# Patient Record
Sex: FEMALE
[De-identification: names, ages, dates, MRNs, and addresses within clinical notes are randomized per-mention and may not be internally consistent; named-entity substitution may affect disease eponyms.]

---

## 2020-09-11 PROBLEM — Z00.00 ENCOUNTER FOR PREVENTIVE HEALTH EXAMINATION: Status: ACTIVE | Noted: 2020-09-11

## 2020-09-21 ENCOUNTER — APPOINTMENT (OUTPATIENT)
Dept: NEUROLOGY | Facility: CLINIC | Age: 40
End: 2020-09-21
Payer: MEDICAID

## 2020-09-21 VITALS
HEIGHT: 63 IN | OXYGEN SATURATION: 97 % | WEIGHT: 176 LBS | BODY MASS INDEX: 31.18 KG/M2 | DIASTOLIC BLOOD PRESSURE: 83 MMHG | SYSTOLIC BLOOD PRESSURE: 122 MMHG | HEART RATE: 78 BPM

## 2020-09-21 VITALS — OXYGEN SATURATION: 97 % | SYSTOLIC BLOOD PRESSURE: 113 MMHG | DIASTOLIC BLOOD PRESSURE: 86 MMHG | HEART RATE: 83 BPM

## 2020-09-21 DIAGNOSIS — R25.1 TREMOR, UNSPECIFIED: ICD-10-CM

## 2020-09-21 PROCEDURE — 99205 OFFICE O/P NEW HI 60 MIN: CPT

## 2020-09-21 RX ORDER — PRIMIDONE 50 MG/1
50 TABLET ORAL
Qty: 60 | Refills: 3 | Status: ACTIVE | COMMUNITY
Start: 2020-09-21 | End: 1900-01-01

## 2020-09-21 NOTE — PHYSICAL EXAM
[General Appearance - Alert] : alert [General Appearance - In No Acute Distress] : in no acute distress [Affect] : the affect was normal [Person] : oriented to person [Place] : oriented to place [Short Term Intact] : short term memory intact [Cranial Nerves Oculomotor (III)] : extraocular motion intact [Cranial Nerves Trigeminal (V)] : facial sensation intact symmetrically [Cranial Nerves Facial (VII)] : face symmetrical [Cranial Nerves Vestibulocochlear (VIII)] : hearing was intact bilaterally [Motor Tone] : muscle tone was normal in all four extremities [Motor Strength] : muscle strength was normal in all four extremities [No Muscle Atrophy] : normal bulk in all four extremities [Motor Handedness Right-Handed] : the patient is right hand dominant [Motor Strength Upper Extremities Bilaterally] : strength was normal in both upper extremities [Motor Strength Lower Extremities Bilaterally] : strength was normal in both lower extremities [Sensation Tactile Decrease] : light touch was intact [FreeTextEntry1] : patient is noted to have postural and intention tremor right worse than left. There is no bradykinesia but interference of movement due to tremor. No difficulty getting up from the chair. No difficulty with tandem walk. Spirals are scanned in EHR. handwriting is large in size. \par Patient's handwriting is much more shakier on the intake form [Extraocular Movements] : extraocular movements were intact [Hearing Threshold Finger Rub Not [place]] : hearing was normal [Neck Cervical Mass (___cm)] : no neck mass was observed [Edema] : there was no peripheral edema [Abdomen Soft] : soft [No CVA Tenderness] : no ~M costovertebral angle tenderness [Abnormal Walk] : normal gait [] : no rash

## 2020-09-21 NOTE — DISCUSSION/SUMMARY
[FreeTextEntry1] : This is a 40-year-old right-handed female who presents with chief complaints of tremors for about 4-5 months. On exam, her tremors are mostly action and intention in nature.  She denies any family history of tremor. She is on several medications that can have tremor as a side effect. Has history of asthma cannot use beta blockers\par \par Impression-tremor, essential tremor versus drug-induced /exacerbated by medications\par \par Plan\par Differential diagnosis as above discussed with the patient\par Patient states that she's had recent labs done. I've asked her to send me a copy I would be interested in checking Depakote level, thyroid, CMP\par Start primidone 50 mg at bedtime. If no side effects can increase to 2 tablets at bedtime after a week. Side effects discussed in detail\par Followup in 3 weeks

## 2020-09-21 NOTE — HISTORY OF PRESENT ILLNESS
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 Patient says she was continued on 60 mg daily.    [FreeTextEntry1] : This is a 40-year-old right-handed female who presents with chief complaints of tremors.\par \par Patient states that she has noticed tremors for the last 4-5 months. No family history of tremor. The tremors are  mostly with activity and interferewith her ability to eat , drink , write. Tremors are worsened with anxiety. Patient does not drink any coffee or alcohol (none since the last year). She has not had any new medications. She was tried on something a few months ago but it led to a rash and was discontinued. She does not remember the name of that medication.\par She is on steady dose of Depakote 750 mg twice a day. She states that she has had recent blood levels checked which were normal I do not have those reports.\par She is also on trazodone and Seroquel.\par \par Patient states that in the past she had issues with alcoholism and would get seizures because of it. She has been on Depakote for 5 years now. She also reports having depression and anxiety. She follows up with psychiatry. She reports a history of asthma.\par \par Review of systems-complete review of systems is performed and is negative except as listed in history of present illness\par \par Past medical history\par Alcoholism\par Seizures\par Anxiety\par Depression\par Asthma\par \par Family history unremarkable\par Social history-she is in a residential program. Has not had any alcohol since last year

## 2020-11-02 ENCOUNTER — APPOINTMENT (OUTPATIENT)
Dept: NEUROLOGY | Facility: CLINIC | Age: 40
End: 2020-11-02

## 2022-03-17 ENCOUNTER — OUTPATIENT (OUTPATIENT)
Dept: OUTPATIENT SERVICES | Facility: HOSPITAL | Age: 42
LOS: 1 days | Discharge: STILL ADMITTED OR ADMINISTRATIVE | End: 2022-03-17
Payer: MEDICAID

## 2022-03-17 PROCEDURE — 90839 PSYTX CRISIS INITIAL 60 MIN: CPT

## 2022-03-18 DIAGNOSIS — Z87.898 PERSONAL HISTORY OF OTHER SPECIFIED CONDITIONS: ICD-10-CM

## 2022-03-18 DIAGNOSIS — G40.909 EPILEPSY, UNSPECIFIED, NOT INTRACTABLE, WITHOUT STATUS EPILEPTICUS: ICD-10-CM

## 2022-03-18 DIAGNOSIS — F41.9 ANXIETY DISORDER, UNSPECIFIED: ICD-10-CM

## 2022-03-18 DIAGNOSIS — F31.9 BIPOLAR DISORDER, UNSPECIFIED: ICD-10-CM

## 2022-03-18 DIAGNOSIS — F31.4 BIPOLAR DISORDER, CURRENT EPISODE DEPRESSED, SEVERE, WITHOUT PSYCHOTIC FEATURES: ICD-10-CM

## 2022-03-18 DIAGNOSIS — F10.21 ALCOHOL DEPENDENCE, IN REMISSION: ICD-10-CM

## 2022-07-25 ENCOUNTER — HOSPITAL ENCOUNTER (INPATIENT)
Dept: HOSPITAL 74 - YASAS | Age: 42
LOS: 11 days | Discharge: HOME | DRG: 772 | End: 2022-08-05
Attending: PSYCHIATRY & NEUROLOGY | Admitting: ALLERGY & IMMUNOLOGY
Payer: COMMERCIAL

## 2022-07-25 VITALS — BODY MASS INDEX: 30.4 KG/M2

## 2022-07-25 DIAGNOSIS — R73.9: ICD-10-CM

## 2022-07-25 DIAGNOSIS — F12.20: ICD-10-CM

## 2022-07-25 DIAGNOSIS — Z86.69: ICD-10-CM

## 2022-07-25 DIAGNOSIS — F25.9: ICD-10-CM

## 2022-07-25 DIAGNOSIS — Z91.013: ICD-10-CM

## 2022-07-25 DIAGNOSIS — Z91.410: ICD-10-CM

## 2022-07-25 DIAGNOSIS — F14.20: ICD-10-CM

## 2022-07-25 DIAGNOSIS — F31.9: ICD-10-CM

## 2022-07-25 DIAGNOSIS — F10.230: Primary | ICD-10-CM

## 2022-07-25 DIAGNOSIS — F19.24: ICD-10-CM

## 2022-07-25 DIAGNOSIS — Z56.0: ICD-10-CM

## 2022-07-25 DIAGNOSIS — F15.10: ICD-10-CM

## 2022-07-25 DIAGNOSIS — Z59.01: ICD-10-CM

## 2022-07-25 DIAGNOSIS — F19.282: ICD-10-CM

## 2022-07-25 DIAGNOSIS — Z62.810: ICD-10-CM

## 2022-07-25 PROCEDURE — U0005 INFEC AGEN DETEC AMPLI PROBE: HCPCS

## 2022-07-25 PROCEDURE — U0003 INFECTIOUS AGENT DETECTION BY NUCLEIC ACID (DNA OR RNA); SEVERE ACUTE RESPIRATORY SYNDROME CORONAVIRUS 2 (SARS-COV-2) (CORONAVIRUS DISEASE [COVID-19]), AMPLIFIED PROBE TECHNIQUE, MAKING USE OF HIGH THROUGHPUT TECHNOLOGIES AS DESCRIBED BY CMS-2020-01-R: HCPCS

## 2022-07-25 SDOH — ECONOMIC STABILITY - HOUSING INSECURITY: SHELTERED HOMELESSNESS: Z59.01

## 2022-07-25 SDOH — ECONOMIC STABILITY - INCOME SECURITY: UNEMPLOYMENT, UNSPECIFIED: Z56.0

## 2022-07-26 VITALS — RESPIRATION RATE: 18 BRPM

## 2022-07-26 LAB
ALBUMIN SERPL-MCNC: 2.9 G/DL (ref 3.4–5)
ALP SERPL-CCNC: 79 U/L (ref 45–117)
ALT SERPL-CCNC: 16 U/L (ref 13–61)
ANION GAP SERPL CALC-SCNC: 11 MMOL/L (ref 8–16)
AST SERPL-CCNC: 11 U/L (ref 15–37)
BILIRUB SERPL-MCNC: 0.3 MG/DL (ref 0.2–1)
BUN SERPL-MCNC: 19.2 MG/DL (ref 7–18)
CALCIUM SERPL-MCNC: 8.4 MG/DL (ref 8.5–10.1)
CHLORIDE SERPL-SCNC: 110 MMOL/L (ref 98–107)
CO2 SERPL-SCNC: 22 MMOL/L (ref 21–32)
CREAT SERPL-MCNC: 0.7 MG/DL (ref 0.55–1.3)
DEPRECATED RDW RBC AUTO: 13.5 % (ref 11.6–15.6)
GLUCOSE SERPL-MCNC: 133 MG/DL (ref 74–106)
HCT VFR BLD CALC: 36.4 % (ref 32.4–45.2)
HGB BLD-MCNC: 12.2 GM/DL (ref 10.7–15.3)
MCH RBC QN AUTO: 30.6 PG (ref 25.7–33.7)
MCHC RBC AUTO-ENTMCNC: 33.4 G/DL (ref 32–36)
MCV RBC: 91.4 FL (ref 80–96)
PLATELET # BLD AUTO: 319 10^3/UL (ref 134–434)
PMV BLD: 9.2 FL (ref 7.5–11.1)
PROT SERPL-MCNC: 5.8 G/DL (ref 6.4–8.2)
RBC # BLD AUTO: 3.98 M/MM3 (ref 3.6–5.2)
SODIUM SERPL-SCNC: 143 MMOL/L (ref 136–145)
WBC # BLD AUTO: 9.2 K/MM3 (ref 4–10)

## 2022-07-26 PROCEDURE — HZ42ZZZ GROUP COUNSELING FOR SUBSTANCE ABUSE TREATMENT, COGNITIVE-BEHAVIORAL: ICD-10-PCS | Performed by: PSYCHIATRY & NEUROLOGY

## 2022-07-26 RX ADMIN — DIVALPROEX SODIUM SCH MG: 250 TABLET, FILM COATED, EXTENDED RELEASE ORAL at 21:33

## 2022-07-26 RX ADMIN — BUDESONIDE AND FORMOTEROL FUMARATE DIHYDRATE SCH: 80; 4.5 AEROSOL RESPIRATORY (INHALATION) at 21:33

## 2022-07-26 RX ADMIN — Medication SCH MG: at 21:33

## 2022-07-26 RX ADMIN — IBUPROFEN PRN MG: 400 TABLET, FILM COATED ORAL at 13:35

## 2022-07-26 RX ADMIN — BACITRACIN ZINC SCH GM: 500 OINTMENT TOPICAL at 19:53

## 2022-07-26 RX ADMIN — BUDESONIDE AND FORMOTEROL FUMARATE DIHYDRATE SCH: 80; 4.5 AEROSOL RESPIRATORY (INHALATION) at 12:45

## 2022-07-26 RX ADMIN — Medication SCH: at 12:45

## 2022-07-27 LAB
APPEARANCE UR: CLEAR
BILIRUB UR STRIP.AUTO-MCNC: NEGATIVE MG/DL
COLOR UR: YELLOW
KETONES UR QL STRIP: NEGATIVE
LEUKOCYTE ESTERASE UR QL STRIP.AUTO: NEGATIVE
NITRITE UR QL STRIP: NEGATIVE
PH UR: 6 [PH] (ref 5–8)
PROT UR QL STRIP: NEGATIVE
PROT UR QL STRIP: NEGATIVE
SP GR UR: 1.01 (ref 1.01–1.03)
UROBILINOGEN UR STRIP-MCNC: 0.2 MG/DL (ref 0.2–1)

## 2022-07-27 RX ADMIN — Medication SCH MG: at 21:42

## 2022-07-27 RX ADMIN — BACITRACIN ZINC SCH: 500 OINTMENT TOPICAL at 11:29

## 2022-07-27 RX ADMIN — Medication SCH MG: at 21:41

## 2022-07-27 RX ADMIN — IBUPROFEN PRN MG: 400 TABLET, FILM COATED ORAL at 15:28

## 2022-07-27 RX ADMIN — BUDESONIDE AND FORMOTEROL FUMARATE DIHYDRATE SCH: 80; 4.5 AEROSOL RESPIRATORY (INHALATION) at 11:30

## 2022-07-27 RX ADMIN — DIVALPROEX SODIUM SCH: 250 TABLET, FILM COATED, EXTENDED RELEASE ORAL at 11:29

## 2022-07-27 RX ADMIN — DIVALPROEX SODIUM SCH MG: 250 TABLET, FILM COATED, EXTENDED RELEASE ORAL at 21:43

## 2022-07-27 RX ADMIN — BUDESONIDE AND FORMOTEROL FUMARATE DIHYDRATE SCH PUFF: 80; 4.5 AEROSOL RESPIRATORY (INHALATION) at 21:42

## 2022-07-27 RX ADMIN — Medication SCH: at 11:29

## 2022-07-27 RX ADMIN — OLANZAPINE SCH MG: 7.5 TABLET, FILM COATED ORAL at 21:44

## 2022-07-28 RX ADMIN — Medication SCH MG: at 21:46

## 2022-07-28 RX ADMIN — OLANZAPINE SCH MG: 7.5 TABLET, FILM COATED ORAL at 21:46

## 2022-07-28 RX ADMIN — DIVALPROEX SODIUM SCH MG: 250 TABLET, FILM COATED, EXTENDED RELEASE ORAL at 21:47

## 2022-07-28 RX ADMIN — BACITRACIN ZINC SCH GM: 500 OINTMENT TOPICAL at 12:17

## 2022-07-28 RX ADMIN — IBUPROFEN PRN MG: 400 TABLET, FILM COATED ORAL at 12:48

## 2022-07-28 RX ADMIN — DIVALPROEX SODIUM SCH MG: 250 TABLET, FILM COATED, EXTENDED RELEASE ORAL at 12:17

## 2022-07-28 RX ADMIN — Medication SCH TAB: at 12:18

## 2022-07-28 RX ADMIN — ALUMINUM HYDROXIDE, MAGNESIUM HYDROXIDE, AND SIMETHICONE PRN ML: 200; 200; 20 SUSPENSION ORAL at 16:34

## 2022-07-28 RX ADMIN — BUDESONIDE AND FORMOTEROL FUMARATE DIHYDRATE SCH PUFF: 80; 4.5 AEROSOL RESPIRATORY (INHALATION) at 21:45

## 2022-07-28 RX ADMIN — BUDESONIDE AND FORMOTEROL FUMARATE DIHYDRATE SCH PUFF: 80; 4.5 AEROSOL RESPIRATORY (INHALATION) at 12:17

## 2022-07-29 RX ADMIN — Medication SCH TAB: at 10:17

## 2022-07-29 RX ADMIN — DIVALPROEX SODIUM SCH MG: 250 TABLET, FILM COATED, EXTENDED RELEASE ORAL at 10:18

## 2022-07-29 RX ADMIN — OLANZAPINE SCH MG: 7.5 TABLET, FILM COATED ORAL at 21:28

## 2022-07-29 RX ADMIN — BUDESONIDE AND FORMOTEROL FUMARATE DIHYDRATE SCH: 80; 4.5 AEROSOL RESPIRATORY (INHALATION) at 21:50

## 2022-07-29 RX ADMIN — BACITRACIN ZINC SCH GM: 500 OINTMENT TOPICAL at 10:19

## 2022-07-29 RX ADMIN — BUDESONIDE AND FORMOTEROL FUMARATE DIHYDRATE SCH PUFF: 80; 4.5 AEROSOL RESPIRATORY (INHALATION) at 10:17

## 2022-07-29 RX ADMIN — DIVALPROEX SODIUM SCH MG: 250 TABLET, FILM COATED, EXTENDED RELEASE ORAL at 21:28

## 2022-07-29 RX ADMIN — IBUPROFEN PRN MG: 400 TABLET, FILM COATED ORAL at 11:17

## 2022-07-29 RX ADMIN — Medication SCH MG: at 21:28

## 2022-07-29 RX ADMIN — ALUMINUM HYDROXIDE, MAGNESIUM HYDROXIDE, AND SIMETHICONE PRN ML: 200; 200; 20 SUSPENSION ORAL at 18:41

## 2022-07-30 RX ADMIN — Medication SCH TAB: at 10:16

## 2022-07-30 RX ADMIN — Medication SCH MG: at 21:34

## 2022-07-30 RX ADMIN — BUDESONIDE AND FORMOTEROL FUMARATE DIHYDRATE SCH PUFF: 80; 4.5 AEROSOL RESPIRATORY (INHALATION) at 21:33

## 2022-07-30 RX ADMIN — OLANZAPINE SCH MG: 7.5 TABLET, FILM COATED ORAL at 21:33

## 2022-07-30 RX ADMIN — DIVALPROEX SODIUM SCH MG: 250 TABLET, FILM COATED, EXTENDED RELEASE ORAL at 21:33

## 2022-07-30 RX ADMIN — BACITRACIN ZINC SCH GM: 500 OINTMENT TOPICAL at 10:43

## 2022-07-30 RX ADMIN — Medication SCH MG: at 21:33

## 2022-07-30 RX ADMIN — BUDESONIDE AND FORMOTEROL FUMARATE DIHYDRATE SCH PUFF: 80; 4.5 AEROSOL RESPIRATORY (INHALATION) at 10:17

## 2022-07-30 RX ADMIN — DIVALPROEX SODIUM SCH MG: 250 TABLET, FILM COATED, EXTENDED RELEASE ORAL at 10:16

## 2022-07-31 RX ADMIN — IBUPROFEN PRN MG: 400 TABLET, FILM COATED ORAL at 19:56

## 2022-07-31 RX ADMIN — BUDESONIDE AND FORMOTEROL FUMARATE DIHYDRATE SCH: 80; 4.5 AEROSOL RESPIRATORY (INHALATION) at 10:46

## 2022-07-31 RX ADMIN — BUDESONIDE AND FORMOTEROL FUMARATE DIHYDRATE SCH PUFF: 80; 4.5 AEROSOL RESPIRATORY (INHALATION) at 21:32

## 2022-07-31 RX ADMIN — Medication SCH MG: at 21:31

## 2022-07-31 RX ADMIN — Medication SCH TAB: at 10:44

## 2022-07-31 RX ADMIN — BACITRACIN ZINC SCH: 500 OINTMENT TOPICAL at 10:45

## 2022-07-31 RX ADMIN — DIVALPROEX SODIUM SCH MG: 250 TABLET, FILM COATED, EXTENDED RELEASE ORAL at 10:45

## 2022-07-31 RX ADMIN — Medication SCH MG: at 21:32

## 2022-07-31 RX ADMIN — DIVALPROEX SODIUM SCH MG: 250 TABLET, FILM COATED, EXTENDED RELEASE ORAL at 21:31

## 2022-07-31 RX ADMIN — OLANZAPINE SCH MG: 7.5 TABLET, FILM COATED ORAL at 21:31

## 2022-08-01 RX ADMIN — BUDESONIDE AND FORMOTEROL FUMARATE DIHYDRATE SCH PUFF: 80; 4.5 AEROSOL RESPIRATORY (INHALATION) at 10:18

## 2022-08-01 RX ADMIN — BUDESONIDE AND FORMOTEROL FUMARATE DIHYDRATE SCH: 80; 4.5 AEROSOL RESPIRATORY (INHALATION) at 21:50

## 2022-08-01 RX ADMIN — Medication SCH MG: at 21:49

## 2022-08-01 RX ADMIN — BACITRACIN ZINC SCH: 500 OINTMENT TOPICAL at 10:19

## 2022-08-01 RX ADMIN — DIVALPROEX SODIUM SCH MG: 250 TABLET, FILM COATED, EXTENDED RELEASE ORAL at 21:48

## 2022-08-01 RX ADMIN — DIVALPROEX SODIUM SCH MG: 250 TABLET, FILM COATED, EXTENDED RELEASE ORAL at 10:18

## 2022-08-01 RX ADMIN — OLANZAPINE SCH MG: 7.5 TABLET, FILM COATED ORAL at 21:49

## 2022-08-01 RX ADMIN — Medication SCH TAB: at 10:18

## 2022-08-02 RX ADMIN — BUDESONIDE AND FORMOTEROL FUMARATE DIHYDRATE SCH PUFF: 80; 4.5 AEROSOL RESPIRATORY (INHALATION) at 10:23

## 2022-08-02 RX ADMIN — BUDESONIDE AND FORMOTEROL FUMARATE DIHYDRATE SCH PUFF: 80; 4.5 AEROSOL RESPIRATORY (INHALATION) at 21:36

## 2022-08-02 RX ADMIN — DIVALPROEX SODIUM SCH MG: 250 TABLET, FILM COATED, EXTENDED RELEASE ORAL at 10:24

## 2022-08-02 RX ADMIN — Medication SCH MG: at 21:34

## 2022-08-02 RX ADMIN — OLANZAPINE SCH MG: 7.5 TABLET, FILM COATED ORAL at 21:34

## 2022-08-02 RX ADMIN — BACITRACIN ZINC SCH: 500 OINTMENT TOPICAL at 10:25

## 2022-08-02 RX ADMIN — DIVALPROEX SODIUM SCH MG: 250 TABLET, FILM COATED, EXTENDED RELEASE ORAL at 21:34

## 2022-08-02 RX ADMIN — Medication SCH TAB: at 10:23

## 2022-08-03 RX ADMIN — BUDESONIDE AND FORMOTEROL FUMARATE DIHYDRATE SCH: 80; 4.5 AEROSOL RESPIRATORY (INHALATION) at 10:23

## 2022-08-03 RX ADMIN — IBUPROFEN PRN MG: 400 TABLET, FILM COATED ORAL at 16:02

## 2022-08-03 RX ADMIN — DIVALPROEX SODIUM SCH MG: 250 TABLET, FILM COATED, EXTENDED RELEASE ORAL at 21:48

## 2022-08-03 RX ADMIN — Medication SCH TAB: at 10:23

## 2022-08-03 RX ADMIN — OLANZAPINE SCH MG: 7.5 TABLET, FILM COATED ORAL at 21:48

## 2022-08-03 RX ADMIN — Medication SCH MG: at 21:48

## 2022-08-03 RX ADMIN — BACITRACIN ZINC SCH: 500 OINTMENT TOPICAL at 10:23

## 2022-08-03 RX ADMIN — BUDESONIDE AND FORMOTEROL FUMARATE DIHYDRATE SCH: 80; 4.5 AEROSOL RESPIRATORY (INHALATION) at 23:20

## 2022-08-03 RX ADMIN — DIVALPROEX SODIUM SCH MG: 250 TABLET, FILM COATED, EXTENDED RELEASE ORAL at 10:24

## 2022-08-04 VITALS — TEMPERATURE: 97.5 F

## 2022-08-04 RX ADMIN — Medication SCH TAB: at 10:52

## 2022-08-04 RX ADMIN — BUDESONIDE AND FORMOTEROL FUMARATE DIHYDRATE SCH PUFF: 80; 4.5 AEROSOL RESPIRATORY (INHALATION) at 10:52

## 2022-08-04 RX ADMIN — ALUMINUM HYDROXIDE, MAGNESIUM HYDROXIDE, AND SIMETHICONE PRN ML: 200; 200; 20 SUSPENSION ORAL at 19:45

## 2022-08-04 RX ADMIN — OLANZAPINE SCH MG: 7.5 TABLET, FILM COATED ORAL at 21:16

## 2022-08-04 RX ADMIN — BACITRACIN ZINC SCH: 500 OINTMENT TOPICAL at 10:52

## 2022-08-04 RX ADMIN — Medication SCH MG: at 21:16

## 2022-08-04 RX ADMIN — DIVALPROEX SODIUM SCH MG: 250 TABLET, FILM COATED, EXTENDED RELEASE ORAL at 10:53

## 2022-08-04 RX ADMIN — BUDESONIDE AND FORMOTEROL FUMARATE DIHYDRATE SCH: 80; 4.5 AEROSOL RESPIRATORY (INHALATION) at 21:17

## 2022-08-04 RX ADMIN — IBUPROFEN PRN MG: 400 TABLET, FILM COATED ORAL at 16:33

## 2022-08-04 RX ADMIN — DIVALPROEX SODIUM SCH MG: 250 TABLET, FILM COATED, EXTENDED RELEASE ORAL at 21:15

## 2022-08-05 VITALS — SYSTOLIC BLOOD PRESSURE: 125 MMHG | DIASTOLIC BLOOD PRESSURE: 77 MMHG | HEART RATE: 73 BPM

## 2022-08-05 RX ADMIN — BUDESONIDE AND FORMOTEROL FUMARATE DIHYDRATE SCH: 80; 4.5 AEROSOL RESPIRATORY (INHALATION) at 10:35

## 2022-08-05 RX ADMIN — ALUMINUM HYDROXIDE, MAGNESIUM HYDROXIDE, AND SIMETHICONE PRN ML: 200; 200; 20 SUSPENSION ORAL at 10:37

## 2022-08-05 RX ADMIN — DIVALPROEX SODIUM SCH MG: 250 TABLET, FILM COATED, EXTENDED RELEASE ORAL at 10:34

## 2022-08-05 RX ADMIN — Medication SCH TAB: at 10:35

## 2022-08-05 RX ADMIN — IBUPROFEN PRN MG: 400 TABLET, FILM COATED ORAL at 16:55

## 2022-08-05 RX ADMIN — BACITRACIN ZINC SCH: 500 OINTMENT TOPICAL at 10:34
